# Patient Record
Sex: FEMALE
[De-identification: names, ages, dates, MRNs, and addresses within clinical notes are randomized per-mention and may not be internally consistent; named-entity substitution may affect disease eponyms.]

---

## 2024-01-17 ENCOUNTER — APPOINTMENT (OUTPATIENT)
Dept: ORTHOPEDIC SURGERY | Facility: CLINIC | Age: 89
End: 2024-01-17
Payer: MEDICARE

## 2024-01-17 VITALS — WEIGHT: 134 LBS | BODY MASS INDEX: 25.3 KG/M2 | HEIGHT: 61 IN

## 2024-01-17 DIAGNOSIS — Z78.9 OTHER SPECIFIED HEALTH STATUS: ICD-10-CM

## 2024-01-17 DIAGNOSIS — S39.012A STRAIN OF MUSCLE, FASCIA AND TENDON OF LOWER BACK, INITIAL ENCOUNTER: ICD-10-CM

## 2024-01-17 PROBLEM — Z00.00 ENCOUNTER FOR PREVENTIVE HEALTH EXAMINATION: Status: ACTIVE | Noted: 2024-01-17

## 2024-01-17 PROCEDURE — 99204 OFFICE O/P NEW MOD 45 MIN: CPT

## 2024-01-17 RX ORDER — APIXABAN 5 MG/1
TABLET, FILM COATED ORAL
Refills: 0 | Status: ACTIVE | COMMUNITY

## 2024-01-17 RX ORDER — METHOCARBAMOL 500 MG/1
500 TABLET, FILM COATED ORAL
Qty: 30 | Refills: 0 | Status: COMPLETED | COMMUNITY
Start: 2024-01-17 | End: 2024-02-16

## 2024-01-17 RX ORDER — ATORVASTATIN CALCIUM 20 MG/1
20 TABLET, FILM COATED ORAL
Refills: 0 | Status: ACTIVE | COMMUNITY

## 2024-01-17 RX ORDER — METOPROLOL TARTRATE 75 MG/1
TABLET, FILM COATED ORAL
Refills: 0 | Status: ACTIVE | COMMUNITY

## 2024-01-17 RX ORDER — TRAMADOL HYDROCHLORIDE 50 MG/1
50 TABLET, COATED ORAL TWICE DAILY
Qty: 30 | Refills: 0 | Status: ACTIVE | COMMUNITY
Start: 2024-01-17 | End: 1900-01-01

## 2024-01-21 NOTE — DISCUSSION/SUMMARY
[Medication Risks Reviewed] : Medication risks reviewed [de-identified] : 88 year old female presents to the office for initial evaluation of the lumbar spine pain s/p slip and fall. At this time, I recommend treating the patient conservatively given acute injury. Prescribed patient Tramadol and Methocarbamol for pain management and to reduce inflammation - use as directed. Advised patient to ambulate as tolerated to reduce pain after sitting. Discussed activity modifications in depth. Follow up in 3-4 weeks for re-evaluation.   Prior to appointment and during encounter with patient extensive medical records were reviewed including but not limited to, hospital records, outpatient records, imaging results, and lab data. During this appointment the patient was examined, diagnoses were discussed and explained in a face to face manner. In addition, extensive time was spent reviewing aforementioned diagnostic studies. Counseling including abnormal image results, differential diagnoses, treatment options, risk and benefits, lifestyle changes, current condition, and current medications was performed. Patient's comments, questions, and concerns were addressed, and patient verbalized understanding. Based on this patient's presentation at our office, which is an orthopedic spine surgeon's office, this patient inherently / intrinsically has a risk, however minute, of developing issues such as Cauda equina syndrome, bowel and bladder changes, or progression of motor or neurological deficits such as paralysis which may be permanent.  JUAN C MONTAGUE Acting as a Scribe for Dr. Patrick HSIEH, Juan C Montague, attest that this documentation has been prepared under the direction and in the presence of Provider aSthya Nguyen MD.

## 2024-01-21 NOTE — HISTORY OF PRESENT ILLNESS
[de-identified] : The patient is a 88 year old female who presents today complaining of low back pain Date of Injury/Onset:  1/8/23 Pain:    At Rest:   10/10 With Activity:   10/10 Mechanism of injury:  took a fall in her bathtub landing on backside Quality of symptoms:  sharp shooting, muscle spasms Improves with:  IBU, aleve Worse with:  sitting and getting up from a sitting position Prior treatment:  [facility] Prior Imaging:  xray, no images presented Additional Information: None   1/17/2024 - Patient presents today for initial evaluation of her lumbar spine pain. Patient reports she was in the shower when she slipped and fell, once she got back up, she went to  the soap that fell and slipped again. Patient notes that she has taken Tylenol and Aleve with mild relief but [facility] physician advised against doing so due to heart issues. Pain worsens at night when she is laying in bed, getting up from a seated position, and with prolonged sitting.  PMHx: Mitral valve prolapse

## 2024-01-21 NOTE — DATA REVIEWED
[Lumbar Spine] : lumbar spine [Outside X-rays] : outside x-rays [Pelvis] : pelvis [Report was reviewed and noted in the chart] : The report was reviewed and noted in the chart [I independently reviewed and interpreted images and report] : I independently reviewed and interpreted images and report [I reviewed the films/CD and additionally noted] : I reviewed the films/CD and additionally noted [FreeTextEntry2] : Unremarkable.  [FreeTextEntry1] : I stop paperwork reviewed

## 2024-01-21 NOTE — PHYSICAL EXAM
[Normal Coordination] : normal coordination [Normal DTR UE/LE] : normal DTR UE/LE  [Normal Sensation] : normal sensation [Normal Mood and Affect] : normal mood and affect [Orientated] : orientated [Normal Skin] : normal skin [No Rash] : no rash [No Ulcers] : no ulcers [No Lesions] : no lesions [No obvious lymphadenopathy in areas examined] : no obvious lymphadenopathy in areas examined [Flexion] : flexion [Extension] : extension [Bending to left] : bending to left [Bending to right] : bending to right [Able to Communicate] : able to communicate [Well Developed] : well developed [Peripheral vascular exam is grossly normal] : peripheral vascular exam is grossly normal [No Respiratory Distress] : no respiratory distress [] : non-antalgic

## 2024-02-05 ENCOUNTER — APPOINTMENT (OUTPATIENT)
Dept: ORTHOPEDIC SURGERY | Facility: CLINIC | Age: 89
End: 2024-02-05

## 2025-02-05 ENCOUNTER — OFFICE (OUTPATIENT)
Dept: URBAN - METROPOLITAN AREA CLINIC 105 | Facility: CLINIC | Age: OVER 89
Setting detail: OPHTHALMOLOGY
End: 2025-02-05
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
## 2025-02-05 PROCEDURE — NO SHOW FE NO SHOW FEE: Performed by: STUDENT IN AN ORGANIZED HEALTH CARE EDUCATION/TRAINING PROGRAM

## 2025-02-12 ENCOUNTER — OFFICE (OUTPATIENT)
Dept: URBAN - METROPOLITAN AREA CLINIC 113 | Facility: CLINIC | Age: OVER 89
Setting detail: OPHTHALMOLOGY
End: 2025-02-12
Payer: COMMERCIAL

## 2025-02-12 DIAGNOSIS — H35.40: ICD-10-CM

## 2025-02-12 DIAGNOSIS — H16.223: ICD-10-CM

## 2025-02-12 DIAGNOSIS — Z96.1: ICD-10-CM

## 2025-02-12 DIAGNOSIS — H43.811: ICD-10-CM

## 2025-02-12 PROCEDURE — 92004 COMPRE OPH EXAM NEW PT 1/>: CPT | Performed by: OPTOMETRIST

## 2025-02-12 PROCEDURE — 92250 FUNDUS PHOTOGRAPHY W/I&R: CPT | Performed by: OPTOMETRIST

## 2025-02-12 ASSESSMENT — KERATOMETRY
OD_K2POWER_DIOPTERS: 45.00
OS_AXISANGLE_DEGREES: 123
OD_K1POWER_DIOPTERS: 44.75
OS_K2POWER_DIOPTERS: 44.50
OD_AXISANGLE_DEGREES: 070
OS_K1POWER_DIOPTERS: 44.00

## 2025-02-12 ASSESSMENT — SUPERFICIAL PUNCTATE KERATITIS (SPK)
OS_SPK: T 1+
OD_SPK: ABSENT

## 2025-02-12 ASSESSMENT — TEAR BREAK UP TIME (TBUT)
OD_TBUT: 6SEC
OS_TBUT: 4SEC

## 2025-02-12 ASSESSMENT — REFRACTION_MANIFEST
OD_ADD: +2.50
OS_SPHERE: PLANO
OD_AXIS: 105
OD_CYLINDER: -0.50
OS_CYLINDER: SPH
OD_VA1: 20/20
OS_VA1: 20/20
OD_SPHERE: PLANO
OS_ADD: +2.50

## 2025-02-12 ASSESSMENT — DRY EYES - PHYSICIAN NOTES: OS_GENERALCOMMENTS: INF

## 2025-02-12 ASSESSMENT — CONFRONTATIONAL VISUAL FIELD TEST (CVF)
OD_FINDINGS: FULL
OS_FINDINGS: FULL

## 2025-02-12 ASSESSMENT — TONOMETRY
OD_IOP_MMHG: 12
OS_IOP_MMHG: 11

## 2025-02-12 ASSESSMENT — REFRACTION_AUTOREFRACTION
OS_AXIS: 069
OS_SPHERE: +0.75
OS_CYLINDER: -0.75
OD_SPHERE: +0.50
OD_CYLINDER: -0.75
OD_AXIS: 105

## 2025-02-12 ASSESSMENT — VISUAL ACUITY
OS_BCVA: 20/25+2
OD_BCVA: 20/25+1

## 2025-02-12 ASSESSMENT — REFRACTION_CURRENTRX
OD_CYLINDER: -0.25
OS_CYLINDER: SPH
OS_OVR_VA: 20/
OD_SPHERE: +4.00
OD_OVR_VA: 20/
OD_AXIS: 86
OS_SPHERE: +3.75